# Patient Record
(demographics unavailable — no encounter records)

---

## 2019-09-24 NOTE — ULT
US Renal Bilateral STANDARD



History: Flank pain



Comparison: CT 2014



Findings: Real-time grayscale and color evaluation of the kidneys and urinary bladder was performed.



The right kidney measures 10.3 x 5.2 x 4.9 cm without mass hydronephrosis or abnormal calcifications.
 Urinary bladder is unremarkable with volume of 70 mL.



Left kidney measures 11.7 x 5.8 x 4.60 m with an endophytic interpolar 1.8 cm cyst.



Impression: No evidence of obstructive uropathy.



Reported By: Linus Law 

Electronically Signed:  9/24/2019 2:13 PM